# Patient Record
Sex: FEMALE
[De-identification: names, ages, dates, MRNs, and addresses within clinical notes are randomized per-mention and may not be internally consistent; named-entity substitution may affect disease eponyms.]

---

## 2024-07-26 ENCOUNTER — DASHBOARD ENCOUNTERS (OUTPATIENT)
Age: 59
End: 2024-07-26

## 2024-08-06 ENCOUNTER — LAB OUTSIDE AN ENCOUNTER (OUTPATIENT)
Dept: RURAL CLINIC 2 | Facility: CLINIC | Age: 59
End: 2024-08-06

## 2024-08-06 ENCOUNTER — OFFICE VISIT (OUTPATIENT)
Dept: RURAL CLINIC 2 | Facility: CLINIC | Age: 59
End: 2024-08-06
Payer: COMMERCIAL

## 2024-08-06 VITALS
HEIGHT: 60 IN | SYSTOLIC BLOOD PRESSURE: 120 MMHG | TEMPERATURE: 97.8 F | HEART RATE: 93 BPM | DIASTOLIC BLOOD PRESSURE: 86 MMHG | BODY MASS INDEX: 25.32 KG/M2 | WEIGHT: 129 LBS

## 2024-08-06 DIAGNOSIS — R10.32 ABDOMINAL CRAMPING IN LEFT LOWER QUADRANT: ICD-10-CM

## 2024-08-06 DIAGNOSIS — K58.1 IRRITABLE BOWEL SYNDROME WITH CONSTIPATION: ICD-10-CM

## 2024-08-06 PROBLEM — 440630006: Status: ACTIVE | Noted: 2024-08-06

## 2024-08-06 PROBLEM — 54586004: Status: ACTIVE | Noted: 2024-08-06

## 2024-08-06 PROBLEM — 422587007: Status: ACTIVE | Noted: 2024-08-06

## 2024-08-06 PROCEDURE — 99244 OFF/OP CNSLTJ NEW/EST MOD 40: CPT | Performed by: INTERNAL MEDICINE

## 2024-08-06 PROCEDURE — 99204 OFFICE O/P NEW MOD 45 MIN: CPT | Performed by: INTERNAL MEDICINE

## 2024-08-06 RX ORDER — HYDROCODONE BITARTRATE AND ACETAMINOPHEN 5; 325 MG/1; MG/1
TABLET ORAL
Qty: 12 TABLET | Refills: 0 | Status: DISCONTINUED | COMMUNITY

## 2024-08-06 RX ORDER — DICYCLOMINE HYDROCHLORIDE 10 MG/1
TAKE 1 CAPSULE BY MOUTH CAPSULE ORAL AS NEEDED
Refills: 0 | Status: ACTIVE | COMMUNITY

## 2024-08-06 RX ORDER — SEMAGLUTIDE 1.34 MG/ML
INJECTION, SOLUTION SUBCUTANEOUS
Qty: 9 MILLILITER | Refills: 0 | Status: DISCONTINUED | COMMUNITY

## 2024-08-06 RX ORDER — SCOPOLAMINE 1.5 MG/1
APPLY 1 PATCH EVERY 72 HOURS AS NEEDED FOR NAUSEA PATCH, EXTENDED RELEASE TRANSDERMAL
Qty: 12 EACH | Refills: 0 | Status: ACTIVE | COMMUNITY

## 2024-08-06 RX ORDER — FAMOTIDINE 40 MG/1
AS DIRECTED TABLET, FILM COATED ORAL
Status: DISCONTINUED | COMMUNITY

## 2024-08-06 RX ORDER — TRANEXAMIC ACID 650 MG/1
TABLET ORAL
Qty: 30 TABLET | Refills: 0 | Status: DISCONTINUED | COMMUNITY

## 2024-08-06 RX ORDER — BUSPIRONE HYDROCHLORIDE 10 MG/1
TABLET ORAL
Qty: 180 EACH | Refills: 0 | Status: ACTIVE | COMMUNITY

## 2024-08-06 RX ORDER — MULTIVITAMIN
1 TABLET TABLET ORAL ONCE A DAY
Status: DISCONTINUED | COMMUNITY

## 2024-08-06 RX ORDER — IBUPROFEN 800 MG/1
TABLET, FILM COATED ORAL
Qty: 30 TABLET | Refills: 0 | Status: DISCONTINUED | COMMUNITY

## 2024-08-06 RX ORDER — TRAZODONE HYDROCHLORIDE 50 MG/1
TAKE 1 TABLET BY MOUTH DAILY AT BEDTIME TABLET ORAL
Qty: 90 EACH | Refills: 0 | Status: ACTIVE | COMMUNITY

## 2024-08-06 RX ORDER — ONDANSETRON 8 MG/1
DISSOLVE 1 TABLET UNDER THE TONGUE AND SWALLOW TWICE DAILY AS NEEDED TABLET, ORALLY DISINTEGRATING ORAL
Qty: 90 EACH | Refills: 0 | Status: ACTIVE | COMMUNITY

## 2024-08-06 RX ORDER — BUPROPION HYDROCHLORIDE 300 MG/1
TABLET, EXTENDED RELEASE ORAL
Qty: 90 TABLET | Refills: 0 | Status: ACTIVE | COMMUNITY

## 2024-08-06 RX ORDER — PLECANATIDE 3 MG/1
1 TABLET TABLET ORAL ONCE A DAY
Status: ACTIVE | COMMUNITY

## 2024-08-06 RX ORDER — METFORMIN HYDROCHLORIDE 500 MG/1
TAKE 1 TABLET BY MOUTH TWICE DAILY TABLET, FILM COATED ORAL
Qty: 180 EACH | Refills: 1 | Status: DISCONTINUED | COMMUNITY

## 2024-08-06 RX ORDER — MONTELUKAST SODIUM 10 MG/1
TABLET, FILM COATED ORAL
Qty: 90 TABLET | Refills: 0 | Status: DISCONTINUED | COMMUNITY

## 2024-08-06 NOTE — HPI-ZZZTODAY'S VISIT
The patient is a 58-year-old female, accompanied by her spouse and referred by Dr. Montano for IBS with constipation.  A copy of this document to be sent to the referring provider.  The patient said she was diagnosed with IBS with constipation after a normal colonoscopy completed in 2021.  For the past couple of years she has had worsening constipation and can go as many as 10 days without a bowel movement.  Associated symptoms include abdominal cramping, bloating and nausea. She recently had a few episodes of fecal incontinence without warning. She initially tried MiraLAX, Dulcolax and magnesium citrate with suboptimal results.  She trialed Amitiza, Trulance and Motegrity with minimal effect.  She recalls a short trial with Linzess 290 mcg and is unsure if the medication was effective.  At that time it was not covered by her insurance.  She underwent hysteroscopy with D/C with ablation  2 weeks ago with her gynecologist and was told her colon was dilated.

## 2024-08-07 PROBLEM — 14760008: Status: ACTIVE | Noted: 2024-08-07

## 2024-08-13 ENCOUNTER — WEB ENCOUNTER (OUTPATIENT)
Dept: RURAL CLINIC 2 | Facility: CLINIC | Age: 59
End: 2024-08-13

## 2024-08-15 ENCOUNTER — OFFICE VISIT (OUTPATIENT)
Dept: URBAN - METROPOLITAN AREA SURGERY CENTER 16 | Facility: SURGERY CENTER | Age: 59
End: 2024-08-15
Payer: COMMERCIAL

## 2024-08-15 ENCOUNTER — LAB OUTSIDE AN ENCOUNTER (OUTPATIENT)
Dept: URBAN - METROPOLITAN AREA SURGERY CENTER 16 | Facility: SURGERY CENTER | Age: 59
End: 2024-08-15

## 2024-08-15 ENCOUNTER — CLAIMS CREATED FROM THE CLAIM WINDOW (OUTPATIENT)
Dept: URBAN - METROPOLITAN AREA CLINIC 4 | Facility: CLINIC | Age: 59
End: 2024-08-15
Payer: COMMERCIAL

## 2024-08-15 DIAGNOSIS — D12.0 ADENOMA OF CECUM: ICD-10-CM

## 2024-08-15 DIAGNOSIS — K63.89 OTHER SPECIFIED DISEASES OF INTESTINE: ICD-10-CM

## 2024-08-15 DIAGNOSIS — D12.0 BENIGN NEOPLASM OF CECUM: ICD-10-CM

## 2024-08-15 DIAGNOSIS — R19.4 ALTERATION IN BOWEL ELIMINATION: ICD-10-CM

## 2024-08-15 PROCEDURE — 45385 COLONOSCOPY W/LESION REMOVAL: CPT | Performed by: INTERNAL MEDICINE

## 2024-08-15 PROCEDURE — 00811 ANES LWR INTST NDSC NOS: CPT | Performed by: NURSE ANESTHETIST, CERTIFIED REGISTERED

## 2024-08-15 PROCEDURE — 88305 TISSUE EXAM BY PATHOLOGIST: CPT | Performed by: PATHOLOGY

## 2024-08-15 PROCEDURE — 00811 ANES LWR INTST NDSC NOS: CPT | Performed by: ANESTHESIOLOGY

## 2024-08-15 RX ORDER — SCOPOLAMINE 1.5 MG/1
APPLY 1 PATCH EVERY 72 HOURS AS NEEDED FOR NAUSEA PATCH, EXTENDED RELEASE TRANSDERMAL
Qty: 12 EACH | Refills: 0 | Status: ACTIVE | COMMUNITY

## 2024-08-15 RX ORDER — BUSPIRONE HYDROCHLORIDE 10 MG/1
TABLET ORAL
Qty: 180 EACH | Refills: 0 | Status: ACTIVE | COMMUNITY

## 2024-08-15 RX ORDER — DICYCLOMINE HYDROCHLORIDE 10 MG/1
TAKE 1 CAPSULE BY MOUTH CAPSULE ORAL AS NEEDED
Refills: 0 | Status: ACTIVE | COMMUNITY

## 2024-08-15 RX ORDER — BUPROPION HYDROCHLORIDE 300 MG/1
TABLET, EXTENDED RELEASE ORAL
Qty: 90 TABLET | Refills: 0 | Status: ACTIVE | COMMUNITY

## 2024-08-15 RX ORDER — ONDANSETRON 8 MG/1
DISSOLVE 1 TABLET UNDER THE TONGUE AND SWALLOW TWICE DAILY AS NEEDED TABLET, ORALLY DISINTEGRATING ORAL
Qty: 90 EACH | Refills: 0 | Status: ACTIVE | COMMUNITY

## 2024-08-15 RX ORDER — PLECANATIDE 3 MG/1
1 TABLET TABLET ORAL ONCE A DAY
Status: ACTIVE | COMMUNITY

## 2024-08-15 RX ORDER — TRAZODONE HYDROCHLORIDE 50 MG/1
TAKE 1 TABLET BY MOUTH DAILY AT BEDTIME TABLET ORAL
Qty: 90 EACH | Refills: 0 | Status: ACTIVE | COMMUNITY

## 2024-08-16 ENCOUNTER — TELEPHONE ENCOUNTER (OUTPATIENT)
Dept: RURAL CLINIC 2 | Facility: CLINIC | Age: 59
End: 2024-08-16

## 2024-08-23 ENCOUNTER — WEB ENCOUNTER (OUTPATIENT)
Dept: RURAL CLINIC 2 | Facility: CLINIC | Age: 59
End: 2024-08-23

## 2024-08-23 RX ORDER — LINACLOTIDE 290 UG/1
1 CAPSULE CAPSULE, GELATIN COATED ORAL ONCE A DAY
Qty: 90 CAPSULE | Refills: 3 | OUTPATIENT
Start: 2024-08-24 | End: 2025-08-19

## 2024-08-27 ENCOUNTER — WEB ENCOUNTER (OUTPATIENT)
Dept: RURAL CLINIC 2 | Facility: CLINIC | Age: 59
End: 2024-08-27

## 2024-08-28 ENCOUNTER — WEB ENCOUNTER (OUTPATIENT)
Dept: RURAL CLINIC 2 | Facility: CLINIC | Age: 59
End: 2024-08-28